# Patient Record
Sex: MALE | Race: BLACK OR AFRICAN AMERICAN | NOT HISPANIC OR LATINO | URBAN - METROPOLITAN AREA
[De-identification: names, ages, dates, MRNs, and addresses within clinical notes are randomized per-mention and may not be internally consistent; named-entity substitution may affect disease eponyms.]

---

## 2022-08-08 ENCOUNTER — EMERGENCY (EMERGENCY)
Facility: HOSPITAL | Age: 52
LOS: 1 days | Discharge: HOME | End: 2022-08-08
Admitting: EMERGENCY MEDICINE

## 2022-08-08 VITALS
RESPIRATION RATE: 18 BRPM | OXYGEN SATURATION: 100 % | SYSTOLIC BLOOD PRESSURE: 120 MMHG | TEMPERATURE: 98 F | DIASTOLIC BLOOD PRESSURE: 67 MMHG | HEART RATE: 107 BPM | WEIGHT: 160.06 LBS

## 2022-08-08 PROCEDURE — 99284 EMERGENCY DEPT VISIT MOD MDM: CPT
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
## 2022-08-08 NOTE — ED ADULT NURSE NOTE - CHIEF COMPLAINT QUOTE
arrives yelling in triage demanding a urinal and water- pt admits to cocaine/fentanyl- was given 2mg IN Narcan and 2mg IV Narcan by EMS- arrives with right forearm 20 gauge

## 2022-08-08 NOTE — ED PROVIDER NOTE - PATIENT PORTAL LINK FT
You can access the FollowMyHealth Patient Portal offered by [facility] by registering at the following website: [email or web address]. By joining [employer]’s FollowMyHealth portal, you will also be able to view your health information using other applications (apps) compatible with our system.

## 2022-08-08 NOTE — ED PROVIDER NOTE - CLINICAL SUMMARY MEDICAL DECISION MAKING FREE TEXT BOX
52 y/o M BIBA s/p using cocaine and fentanyl and also s/p 4 mg Narcan. Pt found to be clinically sober, A&O x 3, in no acute distress, steady gait. Given water and a [employer] card .Pt leaves in no acute distress.

## 2022-08-08 NOTE — ED PROVIDER NOTE - OBJECTIVE STATEMENT
50 y/o M wit PMHx of substance abuse BIBA after pt used cocaine and fentanyl. Was given 4 mg of Narcan. Pt arrived to the ED yelling and requesting water. No other medical complaints. Requesting to drink water then leave. No fever, no chills.

## 2022-08-10 DIAGNOSIS — F19.10 OTHER PSYCHOACTIVE SUBSTANCE ABUSE, UNCOMPLICATED: ICD-10-CM

## 2022-08-10 DIAGNOSIS — F11.99 OPIOID USE, UNSPECIFIED WITH UNSPECIFIED OPIOID-INDUCED DISORDER: ICD-10-CM

## 2022-08-10 DIAGNOSIS — F14.99 COCAINE USE, UNSPECIFIED WITH UNSPECIFIED COCAINE-INDUCED DISORDER: ICD-10-CM

## 2022-08-22 ENCOUNTER — EMERGENCY (EMERGENCY)
Facility: HOSPITAL | Age: 52
LOS: 1 days | Discharge: LEFT BEFORE DISCHARGE | End: 2022-08-22
Attending: EMERGENCY MEDICINE | Admitting: EMERGENCY MEDICINE

## 2022-08-22 VITALS — RESPIRATION RATE: 20 BRPM | OXYGEN SATURATION: 98 % | HEART RATE: 100 BPM | TEMPERATURE: 98 F

## 2022-08-22 LAB — GLUCOSE BLDC GLUCOMTR-MCNC: >600 MG/DL — CRITICAL HIGH (ref 70–99)

## 2022-08-22 PROCEDURE — 99053 MED SERV 10PM-8AM 24 HR FAC: CPT

## 2022-08-22 PROCEDURE — 99284 EMERGENCY DEPT VISIT MOD MDM: CPT

## 2022-08-22 RX ORDER — MIDAZOLAM HYDROCHLORIDE 1 MG/ML
5 INJECTION, SOLUTION INTRAMUSCULAR; INTRAVENOUS ONCE
Refills: 0 | Status: COMPLETED | OUTPATIENT
Start: 2022-08-22 | End: 2022-08-22

## 2022-08-22 NOTE — ED ADULT TRIAGE NOTE - CHIEF COMPLAINT QUOTE
BIBA with complaints of overdose, states 'I sniffed dope and the fentanyl hit me late this time.' Patient was given 1mg narcan in the field. Arrives to ed awake, aox4, speech clear and coherent, independently ambulatory. No signs of injury noted. Yelling in triage. Not allowing for vital signs to be completed in triage.

## 2022-08-22 NOTE — ED PROVIDER NOTE - CLINICAL SUMMARY MEDICAL DECISION MAKING FREE TEXT BOX
50 y/o Male with high glucose whose awake, alert, and refusing care. Patient was escorted out by security. Patient was offered medical intervention multiple times but refused.

## 2022-08-23 DIAGNOSIS — F19.99 OTHER PSYCHOACTIVE SUBSTANCE USE, UNSPECIFIED WITH UNSPECIFIED PSYCHOACTIVE SUBSTANCE-INDUCED DISORDER: ICD-10-CM

## 2022-08-23 DIAGNOSIS — R73.9 HYPERGLYCEMIA, UNSPECIFIED: ICD-10-CM

## 2022-08-23 DIAGNOSIS — Z53.29 PROCEDURE AND TREATMENT NOT CARRIED OUT BECAUSE OF PATIENT'S DECISION FOR OTHER REASONS: ICD-10-CM

## 2022-09-06 ENCOUNTER — EMERGENCY (EMERGENCY)
Facility: HOSPITAL | Age: 52
LOS: 1 days | Discharge: HOME | End: 2022-09-06
Attending: EMERGENCY MEDICINE | Admitting: EMERGENCY MEDICINE

## 2022-09-06 VITALS
DIASTOLIC BLOOD PRESSURE: 75 MMHG | RESPIRATION RATE: 18 BRPM | HEART RATE: 87 BPM | SYSTOLIC BLOOD PRESSURE: 141 MMHG | TEMPERATURE: 98 F | OXYGEN SATURATION: 96 %

## 2022-09-06 PROCEDURE — 99283 EMERGENCY DEPT VISIT LOW MDM: CPT

## 2022-09-06 PROCEDURE — 99053 MED SERV 10PM-8AM 24 HR FAC: CPT

## 2022-09-06 NOTE — ED PROVIDER NOTE - PATIENT PORTAL LINK FT
You can access the FollowMyHealth Patient Portal offered by [facility] by registering at the following website: [email or web address]. By joining [employer]’s FollowMyHealth portal, you will also be able to view your health information using other applications (apps) compatible with our system.

## 2022-09-06 NOTE — ED PROVIDER NOTE - CLINICAL SUMMARY MEDICAL DECISION MAKING FREE TEXT BOX
AMS now alert with steady gait and oriented to person place and time of day, no SI or HI, wishes to leave

## 2022-09-06 NOTE — ED ADULT TRIAGE NOTE - CHIEF COMPLAINT QUOTE
Pt BIBA from [facility] c/o AMS s/t possible drug use. Pt is awake, screaming in triage towards staff. Ambulating with steady gait. Pt demanding to leave.

## 2022-09-06 NOTE — ED ADULT NURSE NOTE - CHIEF COMPLAINT QUOTE
Pt BIBA from [facility] c/o AMS s/t possible drug use. Pt is awake, screaming in triage towards staff. Ambulating with steady gait. Pt demanding to leave.

## 2022-09-06 NOTE — ED ADULT NURSE NOTE - OBJECTIVE STATEMENT
51y male presents to ED c/o AMS. Pt denies ETOH use. Agitated in triage, demanding to leave. Pt ambulating with steady gait.

## 2022-09-06 NOTE — ED PROVIDER NOTE - NSFOLLOWUPINSTRUCTIONS_ED_ALL_ED_FT
Please make a primary care appointment this week or call Patient Access to help with making an appointment.

## 2022-09-08 DIAGNOSIS — R41.82 ALTERED MENTAL STATUS, UNSPECIFIED: ICD-10-CM

## 2022-09-09 ENCOUNTER — EMERGENCY (EMERGENCY)
Facility: HOSPITAL | Age: 52
LOS: 1 days | Discharge: HOME | End: 2022-09-09
Attending: EMERGENCY MEDICINE | Admitting: EMERGENCY MEDICINE

## 2022-09-09 ENCOUNTER — EMERGENCY (EMERGENCY)
Facility: HOSPITAL | Age: 52
LOS: 1 days | Discharge: HOME | End: 2022-09-09
Admitting: EMERGENCY MEDICINE

## 2022-09-09 VITALS
SYSTOLIC BLOOD PRESSURE: 172 MMHG | TEMPERATURE: 98 F | OXYGEN SATURATION: 95 % | HEIGHT: 69 IN | RESPIRATION RATE: 18 BRPM | DIASTOLIC BLOOD PRESSURE: 86 MMHG | HEART RATE: 88 BPM | WEIGHT: 130.07 LBS

## 2022-09-09 VITALS
HEIGHT: 69 IN | RESPIRATION RATE: 18 BRPM | HEART RATE: 92 BPM | OXYGEN SATURATION: 94 % | DIASTOLIC BLOOD PRESSURE: 73 MMHG | SYSTOLIC BLOOD PRESSURE: 156 MMHG | WEIGHT: 130.07 LBS | TEMPERATURE: 98 F

## 2022-09-09 DIAGNOSIS — Z59.00 HOMELESSNESS UNSPECIFIED: ICD-10-CM

## 2022-09-09 DIAGNOSIS — F11.10 OPIOID ABUSE, UNCOMPLICATED: ICD-10-CM

## 2022-09-09 DIAGNOSIS — R42 DIZZINESS AND GIDDINESS: ICD-10-CM
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
## 2022-09-09 DIAGNOSIS — Z20.822 CONTACT WITH AND (SUSPECTED) EXPOSURE TO COVID-19: ICD-10-CM

## 2022-09-09 DIAGNOSIS — F14.10 COCAINE ABUSE, UNCOMPLICATED: ICD-10-CM

## 2022-09-09 DIAGNOSIS — S00.81XA ABRASION OF OTHER PART OF HEAD, INITIAL ENCOUNTER: ICD-10-CM
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
## 2022-09-09 DIAGNOSIS — E11.65 TYPE 2 DIABETES MELLITUS WITH HYPERGLYCEMIA: ICD-10-CM

## 2022-09-09 DIAGNOSIS — Z23 ENCOUNTER FOR IMMUNIZATION: ICD-10-CM

## 2022-09-09 DIAGNOSIS — S09.90XA UNSPECIFIED INJURY OF HEAD, INITIAL ENCOUNTER: ICD-10-CM

## 2022-09-09 DIAGNOSIS — F17.210 NICOTINE DEPENDENCE, CIGARETTES, UNCOMPLICATED: ICD-10-CM

## 2022-09-09 DIAGNOSIS — Z91.14 PATIENT'S OTHER NONCOMPLIANCE WITH MEDICATION REGIMEN: ICD-10-CM

## 2022-09-09 LAB
ALBUMIN SERPL ELPH-MCNC: 2.9 G/DL — LOW (ref 3.4–5)
ALP SERPL-CCNC: 134 U/L — HIGH (ref 40–120)
ALT FLD-CCNC: 91 U/L — HIGH (ref 12–42)
AMPHET UR-MCNC: NEGATIVE — SIGNIFICANT CHANGE UP
ANION GAP SERPL CALC-SCNC: 8 MMOL/L — LOW (ref 9–16)
APPEARANCE UR: CLEAR — SIGNIFICANT CHANGE UP
APTT BLD: 29.4 SEC — SIGNIFICANT CHANGE UP (ref 27.5–35.5)
AST SERPL-CCNC: 49 U/L — HIGH (ref 15–37)
BARBITURATES UR SCN-MCNC: NEGATIVE — SIGNIFICANT CHANGE UP
BASOPHILS # BLD AUTO: 0.04 K/UL — SIGNIFICANT CHANGE UP (ref 0–0.2)
BASOPHILS NFR BLD AUTO: 0.4 % — SIGNIFICANT CHANGE UP (ref 0–2)
BENZODIAZ UR-MCNC: NEGATIVE — SIGNIFICANT CHANGE UP
BILIRUB SERPL-MCNC: 0.5 MG/DL — SIGNIFICANT CHANGE UP (ref 0.2–1.2)
BILIRUB UR-MCNC: NEGATIVE — SIGNIFICANT CHANGE UP
BUN SERPL-MCNC: 9 MG/DL — SIGNIFICANT CHANGE UP (ref 7–23)
CALCIUM SERPL-MCNC: 9 MG/DL — SIGNIFICANT CHANGE UP (ref 8.5–10.5)
CHLORIDE SERPL-SCNC: 95 MMOL/L — LOW (ref 96–108)
CO2 SERPL-SCNC: 32 MMOL/L — HIGH (ref 22–31)
COCAINE METAB.OTHER UR-MCNC: POSITIVE
COLOR SPEC: YELLOW — SIGNIFICANT CHANGE UP
CREAT SERPL-MCNC: 0.97 MG/DL — SIGNIFICANT CHANGE UP (ref 0.5–1.3)
DIFF PNL FLD: NEGATIVE — SIGNIFICANT CHANGE UP
EGFR: 93 ML/MIN/1.73M2 — SIGNIFICANT CHANGE UP
EOSINOPHIL # BLD AUTO: 0.06 K/UL — SIGNIFICANT CHANGE UP (ref 0–0.5)
EOSINOPHIL NFR BLD AUTO: 0.6 % — SIGNIFICANT CHANGE UP (ref 0–6)
ETHANOL SERPL-MCNC: <3 MG/DL — SIGNIFICANT CHANGE UP
GLUCOSE BLDC GLUCOMTR-MCNC: 434 MG/DL — HIGH (ref 70–99)
GLUCOSE BLDC GLUCOMTR-MCNC: 503 MG/DL — CRITICAL HIGH (ref 70–99)
GLUCOSE BLDC GLUCOMTR-MCNC: >600 MG/DL — CRITICAL HIGH (ref 70–99)
GLUCOSE SERPL-MCNC: 553 MG/DL — CRITICAL HIGH (ref 70–99)
GLUCOSE UR QL: >=1000
HCT VFR BLD CALC: 39.2 % — SIGNIFICANT CHANGE UP (ref 39–50)
HGB BLD-MCNC: 13.1 G/DL — SIGNIFICANT CHANGE UP (ref 13–17)
IMM GRANULOCYTES NFR BLD AUTO: 0.3 % — SIGNIFICANT CHANGE UP (ref 0–1.5)
INR BLD: 1.04 — SIGNIFICANT CHANGE UP (ref 0.88–1.16)
KETONES UR-MCNC: NEGATIVE — SIGNIFICANT CHANGE UP
LEUKOCYTE ESTERASE UR-ACNC: NEGATIVE — SIGNIFICANT CHANGE UP
LYMPHOCYTES # BLD AUTO: 2.78 K/UL — SIGNIFICANT CHANGE UP (ref 1–3.3)
LYMPHOCYTES # BLD AUTO: 27.1 % — SIGNIFICANT CHANGE UP (ref 13–44)
MAGNESIUM SERPL-MCNC: 2.1 MG/DL — SIGNIFICANT CHANGE UP (ref 1.6–2.6)
MCHC RBC-ENTMCNC: 30.4 PG — SIGNIFICANT CHANGE UP (ref 27–34)
MCHC RBC-ENTMCNC: 33.4 GM/DL — SIGNIFICANT CHANGE UP (ref 32–36)
MCV RBC AUTO: 91 FL — SIGNIFICANT CHANGE UP (ref 80–100)
METHADONE UR-MCNC: NEGATIVE — SIGNIFICANT CHANGE UP
MONOCYTES # BLD AUTO: 0.73 K/UL — SIGNIFICANT CHANGE UP (ref 0–0.9)
MONOCYTES NFR BLD AUTO: 7.1 % — SIGNIFICANT CHANGE UP (ref 2–14)
NEUTROPHILS # BLD AUTO: 6.6 K/UL — SIGNIFICANT CHANGE UP (ref 1.8–7.4)
NEUTROPHILS NFR BLD AUTO: 64.5 % — SIGNIFICANT CHANGE UP (ref 43–77)
NITRITE UR-MCNC: NEGATIVE — SIGNIFICANT CHANGE UP
NRBC # BLD: 0 /100 WBCS — SIGNIFICANT CHANGE UP (ref 0–0)
OPIATES UR-MCNC: NEGATIVE — SIGNIFICANT CHANGE UP
PCO2 BLDV: 61 MMHG — HIGH (ref 42–55)
PCP SPEC-MCNC: SIGNIFICANT CHANGE UP
PCP UR-MCNC: NEGATIVE — SIGNIFICANT CHANGE UP
PH BLDV: 7.4 — SIGNIFICANT CHANGE UP (ref 7.32–7.43)
PH UR: 6.5 — SIGNIFICANT CHANGE UP (ref 5–8)
PLATELET # BLD AUTO: 274 K/UL — SIGNIFICANT CHANGE UP (ref 150–400)
PO2 BLDV: <35 MMHG — SIGNIFICANT CHANGE UP (ref 25–45)
POTASSIUM SERPL-MCNC: 3.9 MMOL/L — SIGNIFICANT CHANGE UP (ref 3.5–5.3)
POTASSIUM SERPL-SCNC: 3.9 MMOL/L — SIGNIFICANT CHANGE UP (ref 3.5–5.3)
PROT SERPL-MCNC: 7.3 G/DL — SIGNIFICANT CHANGE UP (ref 6.4–8.2)
PROT UR-MCNC: NEGATIVE MG/DL — SIGNIFICANT CHANGE UP
PROTHROM AB SERPL-ACNC: 12.1 SEC — SIGNIFICANT CHANGE UP (ref 10.5–13.4)
RBC # BLD: 4.31 M/UL — SIGNIFICANT CHANGE UP (ref 4.2–5.8)
RBC # FLD: 12.4 % — SIGNIFICANT CHANGE UP (ref 10.3–14.5)
SAO2 % BLDV: 50.5 % — LOW (ref 67–88)
SARS-COV-2 RNA SPEC QL NAA+PROBE: SIGNIFICANT CHANGE UP
SODIUM SERPL-SCNC: 135 MMOL/L — SIGNIFICANT CHANGE UP (ref 132–145)
SP GR SPEC: <=1.005 — SIGNIFICANT CHANGE UP (ref 1–1.03)
THC UR QL: NEGATIVE — SIGNIFICANT CHANGE UP
TROPONIN I, HIGH SENSITIVITY RESULT: 11.5 NG/L — SIGNIFICANT CHANGE UP
UROBILINOGEN FLD QL: 0.2 E.U./DL — SIGNIFICANT CHANGE UP
WBC # BLD: 10.24 K/UL — SIGNIFICANT CHANGE UP (ref 3.8–10.5)
WBC # FLD AUTO: 10.24 K/UL — SIGNIFICANT CHANGE UP (ref 3.8–10.5)

## 2022-09-09 PROCEDURE — 72125 CT NECK SPINE W/O DYE: CPT | Mod: 26

## 2022-09-09 PROCEDURE — 99285 EMERGENCY DEPT VISIT HI MDM: CPT

## 2022-09-09 PROCEDURE — 99218: CPT

## 2022-09-09 PROCEDURE — 70450 CT HEAD/BRAIN W/O DYE: CPT | Mod: 26

## 2022-09-09 PROCEDURE — 70486 CT MAXILLOFACIAL W/O DYE: CPT | Mod: 26

## 2022-09-09 RX ORDER — IBUPROFEN 200 MG
600 TABLET ORAL ONCE
Refills: 0 | Status: COMPLETED | OUTPATIENT
Start: 2022-09-09 | End: 2022-09-09

## 2022-09-09 RX ORDER — POTASSIUM CHLORIDE 20 MEQ
40 PACKET (EA) ORAL ONCE
Refills: 0 | Status: COMPLETED | OUTPATIENT
Start: 2022-09-09 | End: 2022-09-09

## 2022-09-09 RX ORDER — FAMOTIDINE 10 MG/ML
20 INJECTION INTRAVENOUS ONCE
Refills: 0 | Status: DISCONTINUED | OUTPATIENT
Start: 2022-09-09 | End: 2022-09-09

## 2022-09-09 RX ORDER — BACITRACIN ZINC 500 UNIT/G
1 OINTMENT IN PACKET (EA) TOPICAL ONCE
Refills: 0 | Status: COMPLETED | OUTPATIENT
Start: 2022-09-09 | End: 2022-09-09

## 2022-09-09 RX ORDER — SODIUM CHLORIDE 9 MG/ML
1000 INJECTION INTRAMUSCULAR; INTRAVENOUS; SUBCUTANEOUS ONCE
Refills: 0 | Status: COMPLETED | OUTPATIENT
Start: 2022-09-09 | End: 2022-09-09

## 2022-09-09 RX ORDER — INSULIN HUMAN 100 [IU]/ML
12 INJECTION, SOLUTION SUBCUTANEOUS ONCE
Refills: 0 | Status: COMPLETED | OUTPATIENT
Start: 2022-09-09 | End: 2022-09-09

## 2022-09-09 RX ORDER — ACETAMINOPHEN 500 MG
650 TABLET ORAL ONCE
Refills: 0 | Status: COMPLETED | OUTPATIENT
Start: 2022-09-09 | End: 2022-09-09

## 2022-09-09 RX ORDER — ONDANSETRON 8 MG/1
4 TABLET, FILM COATED ORAL ONCE
Refills: 0 | Status: COMPLETED | OUTPATIENT
Start: 2022-09-09 | End: 2022-09-09

## 2022-09-09 RX ORDER — TETANUS TOXOID, REDUCED DIPHTHERIA TOXOID AND ACELLULAR PERTUSSIS VACCINE, ADSORBED 5; 2.5; 8; 8; 2.5 [IU]/.5ML; [IU]/.5ML; UG/.5ML; UG/.5ML; UG/.5ML
0.5 SUSPENSION INTRAMUSCULAR ONCE
Refills: 0 | Status: COMPLETED | OUTPATIENT
Start: 2022-09-09 | End: 2022-09-09

## 2022-09-09 RX ORDER — FAMOTIDINE 10 MG/ML
20 INJECTION INTRAVENOUS ONCE
Refills: 0 | Status: COMPLETED | OUTPATIENT
Start: 2022-09-09 | End: 2022-09-09

## 2022-09-09 RX ADMIN — Medication 1 APPLICATION(S): at 11:57

## 2022-09-09 RX ADMIN — Medication 600 MILLIGRAM(S): at 11:55

## 2022-09-09 RX ADMIN — FAMOTIDINE 20 MILLIGRAM(S): 10 INJECTION INTRAVENOUS at 11:55

## 2022-09-09 RX ADMIN — Medication 650 MILLIGRAM(S): at 11:55

## 2022-09-09 RX ADMIN — SODIUM CHLORIDE 2000 MILLILITER(S): 9 INJECTION INTRAMUSCULAR; INTRAVENOUS; SUBCUTANEOUS at 20:43

## 2022-09-09 RX ADMIN — ONDANSETRON 4 MILLIGRAM(S): 8 TABLET, FILM COATED ORAL at 11:55

## 2022-09-09 RX ADMIN — Medication 1 PATCH: at 11:57

## 2022-09-09 RX ADMIN — INSULIN HUMAN 12 UNIT(S): 100 INJECTION, SOLUTION SUBCUTANEOUS at 23:05

## 2022-09-09 RX ADMIN — Medication 40 MILLIEQUIVALENT(S): at 23:01

## 2022-09-09 RX ADMIN — TETANUS TOXOID, REDUCED DIPHTHERIA TOXOID AND ACELLULAR PERTUSSIS VACCINE, ADSORBED 0.5 MILLILITER(S): 5; 2.5; 8; 8; 2.5 SUSPENSION INTRAMUSCULAR at 11:56

## 2022-09-09 RX ADMIN — SODIUM CHLORIDE 1000 MILLILITER(S): 9 INJECTION INTRAMUSCULAR; INTRAVENOUS; SUBCUTANEOUS at 23:01

## 2022-09-09 SDOH — ECONOMIC STABILITY - HOUSING INSECURITY: HOMELESSNESS UNSPECIFIED: Z59.00
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
## 2022-09-09 NOTE — ED PROVIDER NOTE - CLINICAL SUMMARY MEDICAL DECISION MAKING FREE TEXT BOX
Patient presenting with hyperglycemia, initially >600 on FS b ut on labs was 533. Patient getting IVF. Ordered for SQ insulin and PO K+ once chemistry came back. Patient yelling at staff when it is time for a FS. Will place on obs. If FS can be controlled can be d/c'd on Metformin with nos for fu at [facility].

## 2022-09-09 NOTE — ED PROVIDER NOTE - NS ED ROS FT
Other than symptoms associated with present events the following is reported:  General:  No fever, no chills + weight loss over many months, + dizzy and hungry.   HEENT:  No sore throat.  Respiratory: No cough, no dyspnea, no wheeze.  Cardiovascular:  No chest pain, no palpitations, no orthopnea.  GI: No abdominal pain, no nausea/vomiting, no diarrhea.  : No dysuria, no frequency, no urgency.  Musculoskeletal:  No joint pain, no myalgia.  Endocrine:  No generalized weakness, + polyuria.  Neurological:  No headache, no focal weakness.   Psychiatric: + old facial abrasion from yesterday. No rash.  Heme:  No bruising, no active bleeding.
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
## 2022-09-09 NOTE — ED PROVIDER NOTE - PATIENT PORTAL LINK FT
You can access the FollowMyHealth Patient Portal offered by [facility] by registering at the following website: [email or web address]. By joining [employer]’s FollowMyHealth portal, you will also be able to view your health information using other applications (apps) compatible with our system. You can access the FollowMyHealth Patient Portal offered by [facility] by registering at the following website: [email or web address]. By joining [employer]’s FollowMyHealth portal, you will also be able to view your health information using other applications (apps) compatible with our system.

## 2022-09-09 NOTE — ED PROVIDER NOTE - CARE PLAN
Principal Discharge DX:	Drug abuse   1 Principal Discharge DX:	Drug abuse  Secondary Diagnosis:	Closed head injury
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
## 2022-09-09 NOTE — ED PROVIDER NOTE - NSFOLLOWUPINSTRUCTIONS_ED_ALL_ED_FT
Substance use disorder occurs when a person's repeated use of drugs or alcohol interferes with his or her ability to be productive. This disorder can cause problems with mental and physical health. It can affect your ability to have healthy relationships, and it can keep you from being able to meet your responsibilities at work, home, or school. It can also lead to addiction, which is a condition in which the person cannot stop using the substance consistently for a period of time.    Addiction changes the way the brain works. Because of these changes, addiction is a chronic condition. Substance use disorder can be mild, moderate, or severe.  The most commonly abused substances include:  •Alcohol.  •Tobacco.  •Marijuana.  •Stimulants, such as cocaine and methamphetamine.  •Hallucinogens, such as LSD and PCP.  •Opioids, such as some prescription pain medicines and heroin.      What are the causes?  This condition may develop due to many complex social, psychological, or physical reasons, such as:  •Stress.  •Abuse.  •Peer pressure.  •Anxiety or depression.    What increases the risk?  This condition is more likely to develop in people who:  •Use substances to cope with stress  •Have been abused.  •Have a mental health disorder, such as depression.  •Have a family history of substance use disorder.      What are the signs or symptoms?  Symptoms of this condition include:  •Using the substance for longer periods of time or at a higher dosage than what is normal or intended.  •Having a lasting desire to use the substance.  •Being unable to slow down or stop the use of the substance.  •Spending an abnormal amount of time getting the substance, using the substance, or recovering from using the substance.  •Using the substance in a way that interferes with work, school, social activities, and personal relationships.    •Using the substance even after having negative consequences, such as:  •Health problems.  •Legal or financial troubles.  •Job loss.  •Relationship problems.  •Needing more and more of the substance to get the same effect (developing tolerance).  •Experiencing unpleasant symptoms if you do not use the substance (withdrawal).  •Using the substance to avoid withdrawal symptoms.    How is this diagnosed?  This condition may be diagnosed based on:  •A physical exam.  •Your history of substance use.    •Your symptoms. This includes:  •How substance use affects your life.  •Changes in personality, behaviors, and mood.  •Having at least two symptoms of substance use disorder within a 12-month period.  •Health issues related to substance use, such as liver damage, shortness of breath, fatigue, cough, or heart problems.  •Blood or urine tests to screen for alcohol and drugs.      How is this treated?  This condition may be treated by:  •Stopping substance use safely. This may require taking medicines and being closely monitored for several days.  •Taking part in group and individual counseling from mental health providers who help people with substance use disorder.  •Staying at a live-in (residential) treatment center for several days or weeks.  •Attending daily counseling sessions at a treatment center.    •Taking medicine as told by your health care provider:  •To ease symptoms and prevent complications during withdrawal.  •To treat other mental health issues, such as depression or anxiety.  •To block cravings by causing the same effects as the substance.  •To block the effects of the substance or replace good sensations with unpleasant ones.      •Participating in a support group to share your experience with others who are going through the same thing. These groups are an important part of long-term recovery for many people.    Recovery can be a long process. Many people who undergo treatment start using the substance again after stopping (relapse). If you relapse, that does not mean that treatment will not work.      Follow these instructions at home:  •Take over-the-counter and prescription medicines only as told by your health care provider.  • Do not use any drugs or alcohol.  •Avoid temptations or triggers that you associate with your use of the substance.  •Learn and practice techniques for managing stress.  •Have a plan for vulnerable moments. Get phone numbers of people who are willing to help and who are committed to your recovery.  •Attend support groups on a regular basis. These groups include 12-step programs like Alcoholics Anonymous and Narcotics Anonymous.      •Keep all follow-up visits as told by your health care providers. This is important. This includes continuing to work with therapists and support groups.        Contact a health care provider if:  •You cannot take your medicines as told.  •Your symptoms get worse.  •You have trouble resisting the urge to use drugs or alcohol.      Get help right away if you:  •Relapse.  •Think that you may have taken too much of a drug. The hotline of the National Poison Control Center is (929) 698-5734.  •Have signs of an overdose. Symptoms include:  •Chest pain.  •Confusion.  •Sleepiness or difficulty staying awake.  •Slowed breathing.  •Nausea or vomiting.  •A seizure.    •Have serious thoughts about hurting yourself or someone else.    Drug overdose is an emergency. Do not wait to see if the symptoms will go away. Get medical help right away. Call your local emergency services (611 in the [place] Do not drive yourself to the hospital.   If you ever feel like you may hurt yourself or others, or have thoughts about taking your own life, get help right away. You can go to your nearest emergency department or call:   • Your local emergency services (151 in the [place]    • A suicide crisis helpline, such as the National Suicide Prevention Lifeline at 1-892.987.4043. This is open 24 hours a day.     Summary  •Substance use disorder occurs when a person's repeated use of drugs or alcohol interferes with his or her ability to be productive.    •Taking part in group and individual counseling from mental health providers is a common treatment for people with substance use disorder.    •Recovery can be a long process. Many people who undergo treatment start using the substance again after stopping (relapse). A relapse does not mean that treatment will not work.    •Attend support groups such as Alcoholics Anonymous and Narcotics Anonymous. These groups are an important part of long-term recovery for many people.

## 2022-09-09 NOTE — ED PROVIDER NOTE - NSICDXPASTMEDICALHX_GEN_ALL_CORE_FT
PAST MEDICAL HISTORY:  Cocaine abuse     DM (diabetes mellitus)     Heroin abuse     Homelessness
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
## 2022-09-09 NOTE — ED ADULT TRIAGE NOTE - CHIEF COMPLAINT QUOTE
BIBA requesting his diabetes checked. pt dc'd from [facility] earlier today. BIBA requesting his diabetes checked. pt destin'd from [facility] earlier today. pt yelling for food.
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
## 2022-09-09 NOTE — ED ADULT NURSE NOTE - OBJECTIVE STATEMENT
BIBEMS for c/o "sugar is high" and request for food. Recent dc after heroin use.     On assessment- AOx4, agitated behavior with cooperation, breathing even and unlabored on RA, no apparent distress, VSS in triage x BG HI, able to speak in clear coherent sentences, steady gait unassisted, neuro intact with no apparent facial asymmetry, PERRLA. Pt educated- no food.

## 2022-09-09 NOTE — ED ADULT NURSE NOTE - CAS TRG GEN SKIN COLOR
Reason for Disposition   Message left on unidentified voice mail.  Phone number verified.    Additional Information   Negative: Caller is angry or rude (e.g., hangs up, verbally abusive, yelling)   Negative: Caller hangs up   Negative: Caller has already spoken with the PCP and has no further questions.   Negative: Caller has already spoken with another triager and has no further questions.   Negative: Caller has already spoken with another triager or PCP AND has further questions AND triager able to answer questions.   Negative: Message left on identified voice mail   Negative: No answer.  First attempt to contact caller.  Follow-up call scheduled within 15 minutes.   Negative: Busy signal.  First attempt to contact caller.  Follow-up call scheduled within 15 minutes.    Protocols used: NO CONTACT OR DUPLICATE CONTACT CALL-OSMEL  EMMANUEL x2 at 105pm at [phone]  
Normal for race
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
## 2022-09-10 VITALS
OXYGEN SATURATION: 98 % | DIASTOLIC BLOOD PRESSURE: 78 MMHG | RESPIRATION RATE: 16 BRPM | SYSTOLIC BLOOD PRESSURE: 162 MMHG | TEMPERATURE: 98 F | HEART RATE: 74 BPM

## 2022-09-10 LAB
GLUCOSE BLDC GLUCOMTR-MCNC: 312 MG/DL — HIGH (ref 70–99)
GLUCOSE BLDC GLUCOMTR-MCNC: 328 MG/DL — HIGH (ref 70–99)

## 2022-09-10 PROCEDURE — 99217: CPT

## 2022-09-10 RX ORDER — METFORMIN HYDROCHLORIDE 850 MG/1
1 TABLET ORAL
Qty: 60 | Refills: 0
Start: 2022-09-10 | End: 2022-10-09

## 2022-09-10 RX ORDER — SODIUM CHLORIDE 9 MG/ML
1000 INJECTION INTRAMUSCULAR; INTRAVENOUS; SUBCUTANEOUS
Refills: 0 | Status: DISCONTINUED | OUTPATIENT
Start: 2022-09-10 | End: 2022-09-13

## 2022-09-10 RX ADMIN — SODIUM CHLORIDE 500 MILLILITER(S): 9 INJECTION INTRAMUSCULAR; INTRAVENOUS; SUBCUTANEOUS at 01:32
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
## 2022-09-10 NOTE — ED CDU PROVIDER DISPOSITION NOTE - PATIENT PORTAL LINK FT
You can access the FollowMyHealth Patient Portal offered by [facility] by registering at the following website: [email or web address]. By joining [employer]’s FollowMyHealth portal, you will also be able to view your health information using other applications (apps) compatible with our system.

## 2022-09-14 ENCOUNTER — EMERGENCY (EMERGENCY)
Facility: HOSPITAL | Age: 52
LOS: 1 days | Discharge: HOME | End: 2022-09-14
Attending: EMERGENCY MEDICINE | Admitting: EMERGENCY MEDICINE

## 2022-09-14 VITALS
HEART RATE: 92 BPM | OXYGEN SATURATION: 96 % | TEMPERATURE: 98 F | RESPIRATION RATE: 18 BRPM | DIASTOLIC BLOOD PRESSURE: 69 MMHG | SYSTOLIC BLOOD PRESSURE: 119 MMHG

## 2022-09-14 DIAGNOSIS — F11.10 OPIOID ABUSE, UNCOMPLICATED: ICD-10-CM

## 2022-09-14 DIAGNOSIS — T38.3X6A UNDERDOSING OF INSULIN AND ORAL HYPOGLYCEMIC [ANTIDIABETIC] DRUGS, INITIAL ENCOUNTER: ICD-10-CM

## 2022-09-14 DIAGNOSIS — T40.601A POISONING BY UNSPECIFIED NARCOTICS, ACCIDENTAL (UNINTENTIONAL), INITIAL ENCOUNTER: ICD-10-CM
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
## 2022-09-14 DIAGNOSIS — Z91.14 PATIENT'S OTHER NONCOMPLIANCE WITH MEDICATION REGIMEN: ICD-10-CM

## 2022-09-14 DIAGNOSIS — E11.65 TYPE 2 DIABETES MELLITUS WITH HYPERGLYCEMIA: ICD-10-CM

## 2022-09-14 LAB
ALBUMIN SERPL ELPH-MCNC: 2.7 G/DL — LOW (ref 3.4–5)
ALP SERPL-CCNC: 91 U/L — SIGNIFICANT CHANGE UP (ref 40–120)
ALT FLD-CCNC: 63 U/L — HIGH (ref 12–42)
ANION GAP SERPL CALC-SCNC: 3 MMOL/L — LOW (ref 9–16)
AST SERPL-CCNC: 61 U/L — HIGH (ref 15–37)
BILIRUB SERPL-MCNC: 0.4 MG/DL — SIGNIFICANT CHANGE UP (ref 0.2–1.2)
BUN SERPL-MCNC: 11 MG/DL — SIGNIFICANT CHANGE UP (ref 7–23)
CALCIUM SERPL-MCNC: 8.4 MG/DL — LOW (ref 8.5–10.5)
CHLORIDE SERPL-SCNC: 101 MMOL/L — SIGNIFICANT CHANGE UP (ref 96–108)
CO2 SERPL-SCNC: 30 MMOL/L — SIGNIFICANT CHANGE UP (ref 22–31)
CREAT SERPL-MCNC: 0.75 MG/DL — SIGNIFICANT CHANGE UP (ref 0.5–1.3)
EGFR: 108 ML/MIN/1.73M2 — SIGNIFICANT CHANGE UP
GLUCOSE BLDC GLUCOMTR-MCNC: 316 MG/DL — HIGH (ref 70–99)
GLUCOSE SERPL-MCNC: 405 MG/DL — HIGH (ref 70–99)
HCT VFR BLD CALC: 35.7 % — LOW (ref 39–50)
HGB BLD-MCNC: 12 G/DL — LOW (ref 13–17)
MCHC RBC-ENTMCNC: 30 PG — SIGNIFICANT CHANGE UP (ref 27–34)
MCHC RBC-ENTMCNC: 33.6 GM/DL — SIGNIFICANT CHANGE UP (ref 32–36)
MCV RBC AUTO: 89.3 FL — SIGNIFICANT CHANGE UP (ref 80–100)
NRBC # BLD: 0 /100 WBCS — SIGNIFICANT CHANGE UP (ref 0–0)
PCO2 BLDV: 51 MMHG — SIGNIFICANT CHANGE UP (ref 42–55)
PH BLDV: 7.38 — SIGNIFICANT CHANGE UP (ref 7.32–7.43)
PLATELET # BLD AUTO: 252 K/UL — SIGNIFICANT CHANGE UP (ref 150–400)
PO2 BLDV: 46 MMHG — HIGH (ref 25–45)
POTASSIUM SERPL-MCNC: 3.7 MMOL/L — SIGNIFICANT CHANGE UP (ref 3.5–5.3)
POTASSIUM SERPL-SCNC: 3.7 MMOL/L — SIGNIFICANT CHANGE UP (ref 3.5–5.3)
PROT SERPL-MCNC: 6.5 G/DL — SIGNIFICANT CHANGE UP (ref 6.4–8.2)
RBC # BLD: 4 M/UL — LOW (ref 4.2–5.8)
RBC # FLD: 12.5 % — SIGNIFICANT CHANGE UP (ref 10.3–14.5)
SAO2 % BLDV: 80.4 % — SIGNIFICANT CHANGE UP (ref 67–88)
SODIUM SERPL-SCNC: 134 MMOL/L — SIGNIFICANT CHANGE UP (ref 132–145)
WBC # BLD: 16.16 K/UL — HIGH (ref 3.8–10.5)
WBC # FLD AUTO: 16.16 K/UL — HIGH (ref 3.8–10.5)

## 2022-09-14 PROCEDURE — 99284 EMERGENCY DEPT VISIT MOD MDM: CPT

## 2022-09-14 RX ORDER — INSULIN HUMAN 100 [IU]/ML
6 INJECTION, SOLUTION SUBCUTANEOUS ONCE
Refills: 0 | Status: COMPLETED | OUTPATIENT
Start: 2022-09-14 | End: 2022-09-14

## 2022-09-14 RX ORDER — SODIUM CHLORIDE 9 MG/ML
1000 INJECTION INTRAMUSCULAR; INTRAVENOUS; SUBCUTANEOUS ONCE
Refills: 0 | Status: COMPLETED | OUTPATIENT
Start: 2022-09-14 | End: 2022-09-14

## 2022-09-14 RX ORDER — METFORMIN HYDROCHLORIDE 850 MG/1
500 TABLET ORAL ONCE
Refills: 0 | Status: COMPLETED | OUTPATIENT
Start: 2022-09-14 | End: 2022-09-14

## 2022-09-14 RX ORDER — METFORMIN HYDROCHLORIDE 850 MG/1
1 TABLET ORAL
Qty: 60 | Refills: 0
Start: 2022-09-14 | End: 2022-10-13

## 2022-09-14 RX ADMIN — INSULIN HUMAN 6 UNIT(S): 100 INJECTION, SOLUTION SUBCUTANEOUS at 21:23

## 2022-09-14 RX ADMIN — SODIUM CHLORIDE 1000 MILLILITER(S): 9 INJECTION INTRAMUSCULAR; INTRAVENOUS; SUBCUTANEOUS at 21:14

## 2022-09-14 RX ADMIN — SODIUM CHLORIDE 1000 MILLILITER(S): 9 INJECTION INTRAMUSCULAR; INTRAVENOUS; SUBCUTANEOUS at 20:52

## 2022-09-14 NOTE — ED PROVIDER NOTE - PATIENT PORTAL LINK FT
You can access the FollowMyHealth Patient Portal offered by [facility] by registering at the following website: [email or web address]. By joining [employer]’s FollowMyHealth portal, you will also be able to view your health information using other applications (apps) compatible with our system.

## 2022-09-14 NOTE — ED ADULT NURSE NOTE - CHIEF COMPLAINT QUOTE
Pt BIBEMS from [facility] [address] for overdose. Pt given 2mg of narcan IN by friend. Pt admits to heroine use. Pt non complaint with diabetes medication FS. 492 in field.

## 2022-09-14 NOTE — ED PROVIDER NOTE - NSFOLLOWUPINSTRUCTIONS_ED_ALL_ED_FT
Opioid Use Disorder      Opioid use disorder is a condition in which opioids are used for reasons other than medical care. The person may use them even though taking them hurts the person's health and well-being. These drugs are powerful substances that relieve pain. Opioids include drugs such as heroin as well as prescription medicines for pain, such as:  •Codeine.      •Morphine.       •Hydrocodone.      •Oxycodone.       •Fentanyl.      Taking prescribed opioids regularly can lead to dependence, especially if you take them in larger amounts or more often than they should be taken. Opioid use disorder can lead to problems with mental and physical health, including:  •Depression or anxiety.       •Severe constipation.       •Malnutrition and weight loss.       •Sleep problems.       •Diseases caused by infections, such as hepatitis or HIV.      •Sexual problems.      Opioid use disorder can be dangerous. It increases the risk of suicide and can lead to a life-threatening overdose.      What are the causes?    This condition is caused by taking opioids. Taking opioids again and again results in changes in the brain that make it hard to control opioid use. Many people develop this condition because they like the way they feel when they take opioids or because they get addicted to them.      What increases the risk?    This condition is more likely to develop in people who:  •Have a family history of opioid use disorder.      •Misuse other drugs.      •Have a mental illness, such as depression, post-traumatic stress disorder, or antisocial personality disorder.      •Begin use at an early age, such as during their teenage years.        What are the signs or symptoms?    Symptoms of this condition include:  •Taking opioids in larger amounts or for longer periods than you want to.      •Spending an abnormal amount of time getting opioids, using them, or recovering from their effects.      •Craving opioids.      •Using opioids in a way that interferes with work, school, social activities, and personal relationships.      •Giving up or cutting down on important life activities because of opioid use.      •Using opioids when it is dangerous, such as when driving a car.    •Continuing to use the drug even after it has led to problems such as:  •Physical or mental health problems.       •Legal or financial troubles.       •Job loss.       •Broken relationships.        •Being unable to slow down or stop your use of the drug.      •Needing more and more of an opioid to get the same effect (building up a tolerance).    •Experiencing unpleasant symptoms if you do not use the opioid (withdrawal). Some symptoms of withdrawal include:  •Depression, anxiety, or feeling irritable.      •Nausea or vomiting.      •Muscle aches or spasms.      •Watery eyes.      •Trouble sleeping.      •Yawning.          How is this diagnosed?    This condition is diagnosed based on:  •A physical exam.       •Your history of opioid use.     •Your symptoms. This includes:   •How opioid use affects your life.      •Changes in personality, behaviors, and mood.      •Having at least two symptoms of opioid use disorder within a 12-month period.      •Health issues related to using opioids.        •Blood or urine tests to screen for drugs.        How is this treated?  Person talking with a counselor.   The first goal of treatment is to stop your use of opioids. This must be done safely and may involve taking medicines to lessen withdrawal symptoms. Treatment may also involve:  •Taking part in group and individual counseling from mental health providers who have experience with substance use disorder.       •Staying at a residential treatment center for several days or weeks.       •Attending daily counseling sessions at a treatment center.     •Taking medicines as told by your health care provider that:   •Ease symptoms and prevent complications during withdrawal.       •Block cravings and block the good feeling that you get from using opioids.       •Treat other mental health issues, such as depression or anxiety.       •Reduce agitation.         •Participating in a support group to share your experience with others who are going through the same thing.       •Using opioid maintenance treatment. This involves taking certain kinds of opioid medicines. These medicines satisfy cravings but are safer than opioids that are commonly misused.      Recovery can be a long process. Some people who undergo treatment start using opioids again after stopping (relapse). If you relapse, it does not mean that treatment will not work.      Follow these instructions at home:    Medicines     •Take over-the-counter and prescription medicines only as told by your health care provider.      •Check with your health care provider before starting any new medicines, herbs, or supplements.      General instructions     • Do not use any drugs or alcohol.      •Avoid people and activities that trigger your use of opioids.       •Learn and practice techniques for managing stress.       •Have a plan for vulnerable moments. These are times when you are most likely to relapse. Get phone numbers of those who are willing to help and who are committed to your recovery.      •Attend support groups regularly. These groups provide emotional support, advice, and guidance.       •Keep all follow-up visits. This is important. Follow-up visits include continuing to work with therapists and support groups.        Where to find more information    •[employer] [place] [facility] [employer]: drugabuse.gov      •Substance Abuse and Mental Health Services Administration: samhsa.gov      •Narcotics Anonymous: na.org        Contact a health care provider if:    •You cannot take your medicines as told.      •Your symptoms get worse.      •You have a relapse.        Get help right away if:  •You may have taken too much of an opioid (overdosed). Common symptoms of an overdose include:  •Sleepiness or difficulty waking from sleep.       •Decrease in attention or confusion.      •Slurred speech.       •Slowed breathing and a slow pulse (bradycardia).       •Nausea and vomiting.       •Abnormally small pupils.        •You have serious thoughts about hurting yourself or others.      These symptoms may represent a serious problem that is an emergency. Do not wait to see if the symptoms will go away. Get medical help right away. Call your local emergency services (441 in the [place] Do not drive yourself to the hospital.     If you were prescribed a drug (naloxone) that reverses the effects of an opioid overdose, a friend, family member, or emergency services provider can administer the drug in an emergency.     If you ever feel like you may hurt yourself or others, or have thoughts about taking your own life, get help right away. You can go to your nearest emergency department or call:   • Your local emergency services (571 in the [place]       • A suicide crisis helpline, such as the National Suicide Prevention Lifeline at 1-793.454.6246 or 403 in the [place] This is open 24 hours a day.         Summary    •Opioid use disorder is a condition in which opioids are used for reasons other than medical care.      •Opioid use disorder can be dangerous. It can lead to various mental and physical problems, and an opioid overdose can be life-threatening.      •The first goal of treatment is to stop your use of opioids. This must be done safely and may involve taking medicines to lessen withdrawal symptoms.      This information is not intended to replace advice given to you by your health care provider. Make sure you discuss any questions you have with your health care provider.        Diabetic Hyperglycemia    WHAT YOU NEED TO KNOW:    Diabetic hyperglycemia is a blood glucose (sugar) level that is higher than your diabetes care team provider recommends. You may have more thirst and urinate more often than usual.    DISCHARGE INSTRUCTIONS:    Call 911 for any of the following:   •You have a seizure.      •You begin to breathe fast or are short of breath.      •You become weak and confused.      Return to the emergency department if:   •Your blood sugar level is over 240 mg/dL and you have ketones in your urine.      •Your breath smells fruity.      •You have nausea and are vomiting.      •You have symptoms of dehydration, such as dark yellow urine, dry mouth and lips, and dry skin.      Call your diabetes care team provider if:   •You continue to have higher blood sugar levels than your provider recommends.      •You have questions or concerns about your condition or care.      Medicines:   •Medicines, such as insulin and diabetes pills, decrease blood sugar levels.      •Take your medicine as directed. Contact your healthcare provider if you think your medicine is not helping or if you have side effects. Tell your provider if you are allergic to any medicine. Keep a list of the medicines, vitamins, and herbs you take. Include the amounts, and when and why you take them. Bring the list or the pill bottles to follow-up visits. Carry your medicine list with you in case of an emergency.      Manage diabetic hyperglycemia:   •If you take diabetes medicine or insulin, take it as directed. Missed or wrong doses can cause your blood sugar level to go up.      •Tell your diabetes care team provider if you continue to have trouble managing your blood sugar level. He or she may change the type, amount, or timing of your diabetes medicine or insulin. If you do not take diabetes medicine or insulin, you may need to start.      •Work with your provider to develop a sick day plan. Illness can cause your blood sugar to rise. A sick day plan helps you control your blood sugar level when you are sick.      Prevent diabetic hyperglycemia:   •Check your blood sugar levels regularly. Ask your diabetes care team provider how often to check your blood sugar and what your levels should be.  How to check your blood sugar       Continuous Glucose Monitoring            •Follow your meal plan. Your blood sugar can go up if you eat a large meal or you eat more carbohydrates than recommended. Work with a dietitian to develop a meal plan that is right for you.      •Exercise as directed. Physical activity, such as exercise, can help lower your blood sugar when it is high. It can also keep your blood sugar levels steady over time. Be active for at least 30 minutes, 5 days a week. Include muscle strengthening activities 2 days each week. Do not sit for longer than 30 minutes at a time. Work with your provider to create an activity plan. Children should get at least 60 minutes of physical activity each day.   Family Walking for Exercise           •Check your ketones before exercise if your blood sugar level is above 240 mg/dL. Do not exercise if you have ketones in your urine because your blood sugar level may rise even more. Ask your healthcare provider how to lower your blood sugar when you have ketones.      Follow up with your diabetes care team provider as directed: Your provider may refer you to a dietitian. Write down your questions so you remember to ask them during your visits.

## 2022-09-14 NOTE — ED PROVIDER NOTE - OBJECTIVE STATEMENT
53yo M hx of heroin abuse, hx of DM, noncompliant w meds, presents with heroin overdose.  Was given IN narcan in field by friend who called EMS.  Pt found to be hyperglycemic as well.  States he has not taken anything for his DM2 in over 1 year.  States he is supposed to be taking metformin.  States he was seen at outside hospital yesterday for hyperglycemia, was given insulin and discharged.  Asking for a sandwich before getting insulin or IV fluids.  No physical complaints at this time.

## 2022-09-14 NOTE — ED PROVIDER NOTE - NS ED ROS FT
Principal Discharge DX:	Alcohol withdrawal syndrome with perceptual disturbance  Goal:	Continued abstinence  Assessment and plan of treatment:	Please continue to abstain from alcohol ingestion as it can lead to life-threatening withdrawal. Please see your primary care doctor at your earliest convenience and follow-up at the [facility] which is listed on this document for further psychiatric care.  Secondary Diagnosis:	Bipolar disorder  Goal:	Control of symptoms  Assessment and plan of treatment:	Please see your primary care doctor at your earliest convenience and follow-up at the [facility] which is listed on this document for further psychiatric care.  Secondary Diagnosis:	ADD (attention deficit disorder)  Goal:	Control of symptoms  Assessment and plan of treatment:	Please see your primary care doctor at your earliest convenience and follow-up at the [facility] which is listed on this document for further psychiatric care.  Secondary Diagnosis:	Polysubstance abuse  Goal:	Continued abstinence  Assessment and plan of treatment:	Please continue to abstain from alcohol ingestion and drug use as it can be dangerous and lead to life-threatening withdrawal. Please see your primary care doctor at your earliest convenience and follow-up at the [facility] which is listed on this document for further psychiatric care.  Secondary Diagnosis:	Tachycardia  Goal:	Control of symptoms  Assessment and plan of treatment:	Your heart rate has been fast during multiple hospitalizations, however, initial screening does not show any sign of heart disease. Please see your primary care doctor at your earliest convenience and ensure your heart rate and blood pressure are monitored over time and you can be referred to a cardiologist if needed.
CONSTITUTIONAL:  No fever or chills, no bodyaches  HEENT:  No sore throat or headache, no nasal congestion  PULM:  No cough or shortness of breath  GI:  No diarrhea, no vomiting

## 2022-09-14 NOTE — ED PROVIDER NOTE - PHYSICAL EXAMINATION
General: awake, alert, yelling for a sandwich  Head: NCAT  Eyes: PERRL  Heart: RRR  Lungs: CTAB  Abd: soft, NTND  Neuro: moves all 4 extemities equally

## 2022-09-14 NOTE — ED ADULT NURSE NOTE - OBJECTIVE STATEMENT
Pt presents to ed bibems for overdose, received narcan Pt presents to ed bibems for overdose, received narcan via EMS  pt awake and alert, no signs of respiratory distress  sugar noted to be high, pt reports it's always high   yelling repeatedly for food

## 2022-09-14 NOTE — ED ADULT TRIAGE NOTE - CHIEF COMPLAINT QUOTE
Pt BIBEMS from [facility] street for overdose. Pt given 2mg of narcan IN by friend. Pt admits to heroine use. Pt non complaint with diabetes medication FS. 492 in field.

## 2022-09-14 NOTE — ED PROVIDER NOTE - CLINICAL SUMMARY MEDICAL DECISION MAKING FREE TEXT BOX
53yo M hx of opiate abuse, hx of DM2, noncompliant w meds, presents with opiate overdose, given narcan IN by a friend in field, found to be hyperglycemic on arrival to ED.  Admits to using heroin today.  States he is supposed to take metformin but is noncompliant.  On exam afebrile, VSS, no e/o head trauma, moving all extremities, yelling for a sandwich.  Labs show no e/o DKA.  Pt given IV hydration and insulin with improvement in blood sugar.  Metformin rx refilled.  Pt given first dose of metformin, tolerating PO in ED.  Observed for >2hrs after narcan administration.  A&Ox3, ambulatory w steady gait, stable for dc.

## 2022-09-14 NOTE — ED PROVIDER NOTE - PRO INTERPRETER NEED 2
-- DO NOT REPLY / DO NOT REPLY ALL --  -- Message is from the [facility]--    Patient is requesting a medication refill - medication is on active medication list    Patient is currently OUT of the requested medication.    Was Medication Pended?  Yes.    Rx Name and Dose:  meloxicam (MOBIC) 15 MG tablet    Duration: 30 days    Pharmacy  [id number] - [place] [id number] [facility] [address]    Patient confirmed the above pharmacy as correct?  Yes    Does this request need an existing or new prescription at a pharmacy to be sent to a new pharmacy location?   No    Caller Information       Type Contact Phone    06/04/2022 11:35 AM CDT Phone (Incoming) Bridget Aparicio (Self) [phone] (H)          Alternative phone number: n/a    Turnaround time given to caller:   \"Your doctor's office is closed. This message will be sent to our nurse. They will return your call as soon as they review your message. (Calls after 10 PM will be returned tomorrow morning.)\"  
English
Stable

## 2022-09-16 ENCOUNTER — EMERGENCY (EMERGENCY)
Facility: HOSPITAL | Age: 52
LOS: 1 days | Discharge: HOME | End: 2022-09-16
Attending: EMERGENCY MEDICINE | Admitting: EMERGENCY MEDICINE

## 2022-09-16 VITALS
SYSTOLIC BLOOD PRESSURE: 170 MMHG | HEART RATE: 74 BPM | TEMPERATURE: 97 F | DIASTOLIC BLOOD PRESSURE: 80 MMHG | RESPIRATION RATE: 16 BRPM | OXYGEN SATURATION: 93 %

## 2022-09-16 DIAGNOSIS — F11.10 OPIOID ABUSE, UNCOMPLICATED: ICD-10-CM

## 2022-09-16 DIAGNOSIS — Z91.14 PATIENT'S OTHER NONCOMPLIANCE WITH MEDICATION REGIMEN: ICD-10-CM

## 2022-09-16 DIAGNOSIS — E11.9 TYPE 2 DIABETES MELLITUS WITHOUT COMPLICATIONS: ICD-10-CM

## 2022-09-16 DIAGNOSIS — R41.82 ALTERED MENTAL STATUS, UNSPECIFIED: ICD-10-CM

## 2022-09-16 DIAGNOSIS — Z79.84 LONG TERM (CURRENT) USE OF ORAL HYPOGLYCEMIC DRUGS: ICD-10-CM

## 2022-09-16 LAB
GLUCOSE BLDC GLUCOMTR-MCNC: 498 MG/DL — CRITICAL HIGH (ref 70–99)
GLUCOSE BLDC GLUCOMTR-MCNC: 526 MG/DL — CRITICAL HIGH (ref 70–99)

## 2022-09-16 PROCEDURE — 99284 EMERGENCY DEPT VISIT MOD MDM: CPT

## 2022-09-16 PROCEDURE — 99053 MED SERV 10PM-8AM 24 HR FAC: CPT

## 2022-09-16 RX ORDER — INSULIN HUMAN 100 [IU]/ML
10 INJECTION, SOLUTION SUBCUTANEOUS ONCE
Refills: 0 | Status: COMPLETED | OUTPATIENT
Start: 2022-09-16 | End: 2022-09-16

## 2022-09-16 RX ADMIN — INSULIN HUMAN 10 UNIT(S): 100 INJECTION, SOLUTION SUBCUTANEOUS at 05:02

## 2022-09-16 NOTE — ED PROVIDER NOTE - CLINICAL SUMMARY MEDICAL DECISION MAKING FREE TEXT BOX
Patient here with likely polysubstance intox. No e/o head trauma. Will observe until more awake, alert, steady and with a safe plan for d/c.

## 2022-09-16 NOTE — ED ADULT NURSE NOTE - CHIEF COMPLAINT QUOTE
Dr.Kazenoff Lin Pt undomiciled BIBA for ams from street. Pt admits to "drug" use, denies alcohol use, pain or injuries. Pt refusing to specify which drug he took. No obvious injuries noted.

## 2022-09-16 NOTE — ED PROVIDER NOTE - PHYSICAL EXAMINATION
General: lethargic, arousable to touch, malodorous  Head: NCAT  Eyes: PERRL  Heart: RRR  Lungs: CTAB  Abd: soft, NTND  Neuro: moves all 4 extemities equally

## 2022-09-16 NOTE — ED PROVIDER NOTE - PROGRESS NOTE DETAILS
Pt now A&Ox3, ambulatory w steady gait.  Fingerstick improved after subQ insulin.  Rx for metformin given to pt yesterday.  Stable for dc home.

## 2022-09-16 NOTE — ED ADULT TRIAGE NOTE - CHIEF COMPLAINT QUOTE
Pt undomiciled BIBA for ams from street. Pt admits to "drug" use, denies alcohol use, pain or injuries. Pt refusing to specify which drug he took. No obvious injuries noted.

## 2022-09-16 NOTE — ED PROVIDER NOTE - PATIENT PORTAL LINK FT
You can access the FollowMyHealth Patient Portal offered by [facility] by registering at the following website: [email or web address]. By joining [employer]’s FollowMyHealth portal, you will also be able to view your health information using other applications (apps) compatible with our system.

## 2022-09-16 NOTE — ED PROVIDER NOTE - OBJECTIVE STATEMENT
51yo M hx of polysubstance abuse and DM2, noncompliant w meds, BIBEMS for AMS.  Pt unwilling to participate with history.  Suspect substance abuse.

## 2022-12-01 PROBLEM — Z78.9 OTHER SPECIFIED HEALTH STATUS: Chronic | Status: INACTIVE | Noted: 2022-09-07 | Resolved: 2022-09-14

## 2024-01-01 NOTE — ED ADULT TRIAGE NOTE - CHIEF COMPLAINT QUOTE
arrives yelling in triage demanding a urinal and water- pt admits to cocaine/fentanyl- was given 2mg IN Narcan and 2mg IV Narcan by EMS- arrives with right forearm 20 gauge
follow up with PMD